# Patient Record
Sex: FEMALE | HISPANIC OR LATINO | URBAN - METROPOLITAN AREA
[De-identification: names, ages, dates, MRNs, and addresses within clinical notes are randomized per-mention and may not be internally consistent; named-entity substitution may affect disease eponyms.]

---

## 2022-11-16 ENCOUNTER — TELEPHONE ENCOUNTER (OUTPATIENT)
Dept: URBAN - METROPOLITAN AREA CLINIC 63 | Facility: CLINIC | Age: 42
End: 2022-11-16

## 2023-01-10 ENCOUNTER — WEB ENCOUNTER (OUTPATIENT)
Dept: URBAN - METROPOLITAN AREA CLINIC 63 | Facility: CLINIC | Age: 43
End: 2023-01-10

## 2023-01-13 ENCOUNTER — OFFICE VISIT (OUTPATIENT)
Dept: URBAN - METROPOLITAN AREA CLINIC 63 | Facility: CLINIC | Age: 43
End: 2023-01-13
Payer: COMMERCIAL

## 2023-01-13 VITALS
BODY MASS INDEX: 27.3 KG/M2 | OXYGEN SATURATION: 97 % | WEIGHT: 144.6 LBS | HEIGHT: 61 IN | HEART RATE: 80 BPM | DIASTOLIC BLOOD PRESSURE: 72 MMHG | TEMPERATURE: 97.8 F | SYSTOLIC BLOOD PRESSURE: 108 MMHG

## 2023-01-13 DIAGNOSIS — K59.00 CONSTIPATION, UNSPECIFIED CONSTIPATION TYPE: ICD-10-CM

## 2023-01-13 DIAGNOSIS — R10.13 EPIGASTRIC PAIN: ICD-10-CM

## 2023-01-13 DIAGNOSIS — D50.9 IRON DEFICIENCY ANEMIA, UNSPECIFIED IRON DEFICIENCY ANEMIA TYPE: ICD-10-CM

## 2023-01-13 PROBLEM — 87522002 IRON DEFICIENCY ANEMIA: Status: ACTIVE | Noted: 2023-01-13

## 2023-01-13 PROBLEM — 14760008 CONSTIPATION: Status: ACTIVE | Noted: 2023-01-13

## 2023-01-13 PROCEDURE — 99203 OFFICE O/P NEW LOW 30 MIN: CPT | Performed by: INTERNAL MEDICINE

## 2023-01-13 RX ORDER — CITALOPRAM 20 MG/1
1 TABLET TABLET, FILM COATED ORAL ONCE A DAY
Status: ACTIVE | COMMUNITY

## 2023-01-13 RX ORDER — OMEPRAZOLE 20 MG/1
1 CAPSULE 30 MINUTES BEFORE MORNING MEAL CAPSULE, DELAYED RELEASE ORAL ONCE A DAY
Status: ACTIVE | COMMUNITY

## 2023-01-13 NOTE — HPI-TODAY'S VISIT:
Here for evaluation of left upper abdominal discomfort, heartburn and constipation. States that late last year was admitted at [facility] with abdominal pain and rectal bleeding. She had a colonoscopy done which showed internal hemorrhoids and she had banding done. She states since then she has had a issue of constant dull ache in the left upper quadrant flank area. No relation to eating. Denies any worsening of pain with bowel movements. She does have problems with occasional constipation. She was also iron deficient hence was started on iron supplements. She does take Aleve or Advil 1-2 times a week. Has heartburn which is controlled with omeprazole, no dysphagia reported. No unusual weight loss reported. She has not had an upper endoscopy.

## 2023-01-31 ENCOUNTER — CLAIMS CREATED FROM THE CLAIM WINDOW (OUTPATIENT)
Dept: URBAN - METROPOLITAN AREA SURGERY CENTER 4 | Facility: SURGERY CENTER | Age: 43
End: 2023-01-31
Payer: COMMERCIAL

## 2023-01-31 DIAGNOSIS — K29.50 CHRONIC GASTRITIS WITHOUT BLEEDING: ICD-10-CM

## 2023-01-31 DIAGNOSIS — K44.9 HIATAL HERNIA: ICD-10-CM

## 2023-01-31 DIAGNOSIS — R19.8 OTHER SPECIFIED SYMPTOMS AND SIGNS INVOLVING THE DIGESTIVE SYSTEM AND ABDOMEN: ICD-10-CM

## 2023-01-31 PROCEDURE — 43239 EGD BIOPSY SINGLE/MULTIPLE: CPT | Performed by: INTERNAL MEDICINE

## 2023-01-31 RX ORDER — OMEPRAZOLE 20 MG/1
1 CAPSULE 30 MINUTES BEFORE MORNING MEAL CAPSULE, DELAYED RELEASE ORAL ONCE A DAY
Status: ACTIVE | COMMUNITY

## 2023-01-31 RX ORDER — CITALOPRAM 20 MG/1
1 TABLET TABLET, FILM COATED ORAL ONCE A DAY
Status: ACTIVE | COMMUNITY

## 2023-02-01 PROBLEM — 84568007 ATROPHIC GASTRITIS: Status: ACTIVE | Noted: 2023-02-01

## 2023-02-28 ENCOUNTER — DASHBOARD ENCOUNTERS (OUTPATIENT)
Age: 43
End: 2023-02-28

## 2023-02-28 ENCOUNTER — OFFICE VISIT (OUTPATIENT)
Dept: URBAN - METROPOLITAN AREA CLINIC 63 | Facility: CLINIC | Age: 43
End: 2023-02-28
Payer: COMMERCIAL

## 2023-02-28 ENCOUNTER — WEB ENCOUNTER (OUTPATIENT)
Dept: URBAN - METROPOLITAN AREA CLINIC 63 | Facility: CLINIC | Age: 43
End: 2023-02-28

## 2023-02-28 VITALS
DIASTOLIC BLOOD PRESSURE: 60 MMHG | TEMPERATURE: 97.9 F | HEIGHT: 61 IN | SYSTOLIC BLOOD PRESSURE: 110 MMHG | HEART RATE: 61 BPM | WEIGHT: 140 LBS | OXYGEN SATURATION: 97 % | BODY MASS INDEX: 26.43 KG/M2

## 2023-02-28 DIAGNOSIS — K20.90 ESOPHAGITIS: ICD-10-CM

## 2023-02-28 DIAGNOSIS — K31.7 GASTRIC POLYP: ICD-10-CM

## 2023-02-28 DIAGNOSIS — K30 FUNCTIONAL DYSPEPSIA: ICD-10-CM

## 2023-02-28 DIAGNOSIS — K44.9 HIATAL HERNIA: ICD-10-CM

## 2023-02-28 PROBLEM — 78809005 GASTRIC POLYP: Status: ACTIVE | Noted: 2023-02-28

## 2023-02-28 PROBLEM — 3696007: Status: ACTIVE | Noted: 2023-02-28

## 2023-02-28 PROCEDURE — 99214 OFFICE O/P EST MOD 30 MIN: CPT | Performed by: NURSE PRACTITIONER

## 2023-02-28 RX ORDER — OMEPRAZOLE 20 MG/1
1 CAPSULE 30 MINUTES BEFORE MORNING MEAL CAPSULE, DELAYED RELEASE ORAL ONCE A DAY
Status: ACTIVE | COMMUNITY

## 2023-02-28 RX ORDER — OMEPRAZOLE 40 MG/1
1 CAPSULE 30 MINUTES BEFORE MORNING MEAL CAPSULE, DELAYED RELEASE ORAL ONCE A DAY
Qty: 30 | Refills: 0 | OUTPATIENT
Start: 2023-02-28

## 2023-02-28 RX ORDER — CITALOPRAM 20 MG/1
1 TABLET TABLET, FILM COATED ORAL ONCE A DAY
Status: ACTIVE | COMMUNITY

## 2023-02-28 NOTE — HPI-PREVIOUS PROCEDURES
Upper endoscopy 01/31/2023 at [facility] findings: Normal esophagus. 1 cm hiatal hernia. Chronic gastritis. Normal examined duodenum. Pathology findings: Gastric mucosa without significant histopathological change, H. pylori negative. Esophagus having squamous mucosa with focal basal cell hyperplasia.  No evidence for eosinophilic esophagitis, dysplasia or malignancy seen.

## 2023-02-28 NOTE — HPI-TODAY'S VISIT:
Ms. Frederick is a pleasant 42-year-old female evaluated in follow-up of upper endoscopy.  She was previously evaluated 01/13/2023 as a new patient with complaint of LUQ abdominal discomfort, heartburn and constipation.  History of iron deficiency, taking oral iron supplement. Reported colonoscopy late 2022 while hospitalized with abdominal pain and rectal bleeding.  Finding of internal hemorrhoid with subsequent banding. She reported having dull LUQ and left flank discomfort since colonoscopy. Admitted occasional constipation. Taking Advil 1-2 times weekly. Heartburn controlled with omeprazole. Upper endoscopy ordered for further evaluation. Physical exam with finding suggestive of costochondritis.  Today, she complains of continued chronic LLQ abdominal pain. Pain comes and goes about once weekly.   Having daily BM.  Will see [facility] during times of pain, also BMs require straining at that time. Taking omeprazole 20 mg once daily x2 years.

## 2023-03-03 ENCOUNTER — TELEPHONE ENCOUNTER (OUTPATIENT)
Dept: URBAN - METROPOLITAN AREA CLINIC 63 | Facility: CLINIC | Age: 43
End: 2023-03-03

## 2023-03-07 ENCOUNTER — TELEPHONE ENCOUNTER (OUTPATIENT)
Dept: URBAN - METROPOLITAN AREA CLINIC 7 | Facility: CLINIC | Age: 43
End: 2023-03-07

## 2023-04-21 ENCOUNTER — OFFICE VISIT (OUTPATIENT)
Dept: URBAN - METROPOLITAN AREA CLINIC 63 | Facility: CLINIC | Age: 43
End: 2023-04-21